# Patient Record
Sex: MALE | Race: WHITE | NOT HISPANIC OR LATINO | Employment: FULL TIME | URBAN - METROPOLITAN AREA
[De-identification: names, ages, dates, MRNs, and addresses within clinical notes are randomized per-mention and may not be internally consistent; named-entity substitution may affect disease eponyms.]

---

## 2022-07-18 ENCOUNTER — OFFICE VISIT (OUTPATIENT)
Dept: FAMILY MEDICINE CLINIC | Facility: CLINIC | Age: 32
End: 2022-07-18

## 2022-07-18 VITALS
DIASTOLIC BLOOD PRESSURE: 80 MMHG | HEART RATE: 92 BPM | WEIGHT: 220.4 LBS | HEIGHT: 66 IN | OXYGEN SATURATION: 98 % | BODY MASS INDEX: 35.42 KG/M2 | SYSTOLIC BLOOD PRESSURE: 128 MMHG

## 2022-07-18 DIAGNOSIS — E66.09 CLASS 2 OBESITY DUE TO EXCESS CALORIES WITHOUT SERIOUS COMORBIDITY WITH BODY MASS INDEX (BMI) OF 35.0 TO 35.9 IN ADULT: ICD-10-CM

## 2022-07-18 DIAGNOSIS — B35.6 TINEA CRURIS: Primary | ICD-10-CM

## 2022-07-18 PROBLEM — U09.9 LONG COVID: Status: ACTIVE | Noted: 2022-07-18

## 2022-07-18 PROCEDURE — 99203 OFFICE O/P NEW LOW 30 MIN: CPT | Performed by: FAMILY MEDICINE

## 2022-07-18 RX ORDER — CLOTRIMAZOLE 1 %
1 CREAM (GRAM) TOPICAL 2 TIMES DAILY
COMMUNITY
End: 2022-07-18

## 2022-07-18 RX ORDER — FLUCONAZOLE 50 MG/1
50 TABLET ORAL DAILY
Qty: 14 TABLET | Refills: 0 | Status: SHIPPED | OUTPATIENT
Start: 2022-07-18 | End: 2022-08-01

## 2022-08-12 DIAGNOSIS — B35.6 TINEA CRURIS: ICD-10-CM

## 2022-08-12 RX ORDER — FLUCONAZOLE 50 MG/1
50 TABLET ORAL DAILY
Qty: 14 TABLET | Refills: 0 | Status: CANCELLED | OUTPATIENT
Start: 2022-08-12 | End: 2022-08-26

## 2022-08-13 NOTE — TELEPHONE ENCOUNTER
Rx Refill Note  Requested Prescriptions     Pending Prescriptions Disp Refills   • Ketoconazole 2 % gel 45 g 2     Sig: Apply 1 application topically Daily.   • fluconazole (DIFLUCAN) 50 MG tablet 14 tablet 0     Sig: Take 1 tablet by mouth Daily for 14 days. May need to be repeated up to 6 weeks      Last office visit with prescribing clinician: 7/18/2022      Next office visit with prescribing clinician: 1/18/2023 }  Bonnie Carlson MA  08/13/22, 09:41 EDT     Last fill: 07/18/2022

## 2023-01-18 ENCOUNTER — OFFICE VISIT (OUTPATIENT)
Dept: FAMILY MEDICINE CLINIC | Facility: CLINIC | Age: 33
End: 2023-01-18
Payer: COMMERCIAL

## 2023-01-18 ENCOUNTER — LAB (OUTPATIENT)
Dept: LAB | Facility: HOSPITAL | Age: 33
End: 2023-01-18
Payer: COMMERCIAL

## 2023-01-18 VITALS
HEART RATE: 105 BPM | OXYGEN SATURATION: 99 % | HEIGHT: 66 IN | DIASTOLIC BLOOD PRESSURE: 68 MMHG | TEMPERATURE: 97.8 F | BODY MASS INDEX: 35.17 KG/M2 | SYSTOLIC BLOOD PRESSURE: 100 MMHG | WEIGHT: 218.8 LBS

## 2023-01-18 DIAGNOSIS — Z13.220 SCREENING FOR HYPERLIPIDEMIA: ICD-10-CM

## 2023-01-18 DIAGNOSIS — Z13.0 SCREENING FOR DEFICIENCY ANEMIA: ICD-10-CM

## 2023-01-18 DIAGNOSIS — R07.89 ATYPICAL CHEST PAIN: ICD-10-CM

## 2023-01-18 DIAGNOSIS — E66.09 CLASS 2 OBESITY DUE TO EXCESS CALORIES WITHOUT SERIOUS COMORBIDITY WITH BODY MASS INDEX (BMI) OF 35.0 TO 35.9 IN ADULT: ICD-10-CM

## 2023-01-18 DIAGNOSIS — Z13.29 SCREENING FOR ENDOCRINE DISORDER: ICD-10-CM

## 2023-01-18 DIAGNOSIS — Z23 IMMUNIZATION DUE: ICD-10-CM

## 2023-01-18 DIAGNOSIS — U09.9 LONG COVID: ICD-10-CM

## 2023-01-18 DIAGNOSIS — Z00.00 WELL ADULT EXAM: Primary | ICD-10-CM

## 2023-01-18 LAB
ALBUMIN SERPL-MCNC: 4.8 G/DL (ref 3.5–5.2)
ALBUMIN/GLOB SERPL: 1.8 G/DL
ALP SERPL-CCNC: 42 U/L (ref 39–117)
ALT SERPL W P-5'-P-CCNC: 49 U/L (ref 1–41)
ANION GAP SERPL CALCULATED.3IONS-SCNC: 6.4 MMOL/L (ref 5–15)
AST SERPL-CCNC: 30 U/L (ref 1–40)
BILIRUB SERPL-MCNC: 0.4 MG/DL (ref 0–1.2)
BUN SERPL-MCNC: 11 MG/DL (ref 6–20)
BUN/CREAT SERPL: 10.8 (ref 7–25)
CALCIUM SPEC-SCNC: 9.8 MG/DL (ref 8.6–10.5)
CHLORIDE SERPL-SCNC: 103 MMOL/L (ref 98–107)
CHOLEST SERPL-MCNC: 166 MG/DL (ref 0–200)
CO2 SERPL-SCNC: 29.6 MMOL/L (ref 22–29)
CREAT SERPL-MCNC: 1.02 MG/DL (ref 0.76–1.27)
CRP SERPL-MCNC: <0.3 MG/DL (ref 0–0.5)
DEPRECATED RDW RBC AUTO: 40.6 FL (ref 37–54)
EGFRCR SERPLBLD CKD-EPI 2021: 99.5 ML/MIN/1.73
ERYTHROCYTE [DISTWIDTH] IN BLOOD BY AUTOMATED COUNT: 12.5 % (ref 12.3–15.4)
ERYTHROCYTE [SEDIMENTATION RATE] IN BLOOD: 14 MM/HR (ref 0–15)
GLOBULIN UR ELPH-MCNC: 2.6 GM/DL
GLUCOSE SERPL-MCNC: 97 MG/DL (ref 65–99)
HBA1C MFR BLD: 5.6 % (ref 4.8–5.6)
HCT VFR BLD AUTO: 44.6 % (ref 37.5–51)
HCV AB SER DONR QL: NORMAL
HDLC SERPL-MCNC: 48 MG/DL (ref 40–60)
HGB BLD-MCNC: 15.4 G/DL (ref 13–17.7)
LDLC SERPL CALC-MCNC: 91 MG/DL (ref 0–100)
LDLC/HDLC SERPL: 1.82 {RATIO}
MCH RBC QN AUTO: 30.9 PG (ref 26.6–33)
MCHC RBC AUTO-ENTMCNC: 34.5 G/DL (ref 31.5–35.7)
MCV RBC AUTO: 89.4 FL (ref 79–97)
PLATELET # BLD AUTO: 245 10*3/MM3 (ref 140–450)
PMV BLD AUTO: 10.2 FL (ref 6–12)
POTASSIUM SERPL-SCNC: 4.3 MMOL/L (ref 3.5–5.2)
PROT SERPL-MCNC: 7.4 G/DL (ref 6–8.5)
RBC # BLD AUTO: 4.99 10*6/MM3 (ref 4.14–5.8)
SODIUM SERPL-SCNC: 139 MMOL/L (ref 136–145)
TRIGL SERPL-MCNC: 153 MG/DL (ref 0–150)
TSH SERPL DL<=0.05 MIU/L-ACNC: 0.66 UIU/ML (ref 0.27–4.2)
VLDLC SERPL-MCNC: 27 MG/DL (ref 5–40)
WBC NRBC COR # BLD: 8.18 10*3/MM3 (ref 3.4–10.8)

## 2023-01-18 PROCEDURE — 80061 LIPID PANEL: CPT | Performed by: FAMILY MEDICINE

## 2023-01-18 PROCEDURE — 99395 PREV VISIT EST AGE 18-39: CPT | Performed by: FAMILY MEDICINE

## 2023-01-18 PROCEDURE — 86803 HEPATITIS C AB TEST: CPT | Performed by: FAMILY MEDICINE

## 2023-01-18 PROCEDURE — 83036 HEMOGLOBIN GLYCOSYLATED A1C: CPT | Performed by: FAMILY MEDICINE

## 2023-01-18 PROCEDURE — 80053 COMPREHEN METABOLIC PANEL: CPT | Performed by: FAMILY MEDICINE

## 2023-01-18 PROCEDURE — 86140 C-REACTIVE PROTEIN: CPT | Performed by: FAMILY MEDICINE

## 2023-01-18 PROCEDURE — 85027 COMPLETE CBC AUTOMATED: CPT | Performed by: FAMILY MEDICINE

## 2023-01-18 PROCEDURE — 85652 RBC SED RATE AUTOMATED: CPT | Performed by: FAMILY MEDICINE

## 2023-01-18 PROCEDURE — 84443 ASSAY THYROID STIM HORMONE: CPT | Performed by: FAMILY MEDICINE

## 2023-01-18 NOTE — LETTER
"VACCINE CONSENT FORM      Patient Name:  Igor Graham    Patient :  1990      I/We have read, or have been explained, the information about the diseases and the vaccines listed below.  There was an opportunity to ask questions and any questions were answered satisfactorily.  I/We believe that I/we understand the benefits and risks of the vaccines(s) cited, and ask the vaccine(s) listed below be given to me/us or the person named above (for which i have authorized to make the request).      Vaccine(s) give:    Orders Placed This Encounter   Procedures   • Tdap Vaccine Greater Than or Equal To 8yo IM         Medicare patients:    The only vaccine covered under your medical benefit is flu/pneumonia and hepatitis B.  All other may be covered under your \"Part D\" prescription plan and requires you to go to a pharmacy with a Physician orders for administration.  If you still prefer to have it administered at our office, you will receive a bill for the vaccine and administration cost.               Patient Initials                     Patient or Parent/Guardian Signature                    Date        A copy of the appropriate Centers for Disease Control and Prevention Vaccine Information Statements has been provided.   "

## 2023-01-18 NOTE — PROGRESS NOTES
Annual Well Adult Visit     Patient Name: Igor Graham  : 1990   MRN: [id number]   Care Team: Patient Care Team:  Francois Porter DO as PCP - General (Family Medicine)    Chief Complaint:    Chief Complaint   Patient presents with   • Annual Exam       History of Present Illness: Igor Graham is a 32 y.o. male who presents today for annual physical exam and preventative care.    HPI    The patient presents today for an annual well adult exam. His last visit with me was in 2022 to establish care and for an acute concern of tinea cruris. He was prescribed Diflucan for 2 weeks along with ketoconazole gel.    The patient reports that he saw a dermatologist who told him that his symptoms were a result of some type of inflammation as opposed to jock itch and prescribed him an anti-inflammatory cream. He add that his symptoms have completely resolved at this time.     The patient denies any major changes in his health since our last visit in 2022. He states that he does occasionally notice some body aches and chest pain that he attributes long COVID-19 symptoms. He denies any urinary issues related to his long COVID-19 symptoms. He denies painful or nocturnal urination. He reports that he has been dealing with long COVID-19 symptoms since  and has just been dealing with it.     The patient reports that the physician he visited in  and  for his long COVID-19 symptoms told him that he may have myocarditis. He adds that he told him at that point in time that the best thing for him to do would be to work out. He notes that he had told that physician that he was having consistent chest pain and his heart rate was also consistently elevated which brought that doctor to the possibility of myocarditis. The patient is asking if he should have been referred to a cardiologist for this issue. The patient denies working out due to the fact that he did not want to attend a gym however, he has recently  purchased some home gym equipment to begin exercising at home. He adds that he and his wife became ill the week after [place] and he has not quite gotten back into the swing of things yet.     He reports that he has plans to begin seeing a dentist in 2023. He denies having any issues with his hearing. He adds that he had frequent ear infections as a child which led to having tubes placed in his ears. He notes that he has had one ear infection as an adult when he was approximately 19 years old. He denies having any vision problems at this time and has had eye surgery in the past. He adds that he has occasional double vision. He denies any testicular swelling, lumps, or concerns. He denies any sexual dysfunction or erectile issues. He denies any alcohol, drug, or tobacco use. He denies any numbness or tingling in his hands or feet.     He reports that his heart rate has not been severely elevated since he attended physical therapy. He states that if he is relaxed and sitting down it is usually at 100 bpm or less. He adds that if he gets up and starts moving around and he checks his heart rate, he notes that it may get up to 110 bpm. He states that before he became ill his heart rate would average approximately 60 to 70 bpm.     The patient is unsure as to when he most recent tetanus vaccination occurred. He notes that he visited the emergency room in [place] in 2017 when he cut the tip of his finger and had to have them glue it closed and he does not remember if they gave him any injections at that time. He is agreeable to receiving a tetanus booster at this visit. He notes that he has had a tear in his meniscus in the past.     This patient is accompanied by their self who contributes to the history of their care.    The following portions of the patient's history were reviewed and updated as appropriate: allergies, current medications, past family history, past medical history, past social history, past  surgical history and problem list.       Allied Screenings    N/A         Date      Eye Exam       []              []   Up to date    Location:   []   Recommended       Counseled every 2 years in those without known issues, yearly if wearing glasses or contacts      Dental Exam       []           []   Up to date   Location:   []   Recommended       Counseled, recommended cleanings every 6 months, daily brushing and flossing        Skin Cancer Screening        []            []   Up to date   Location:   []   Recommended Counseled on regular sunscreen wear, self-skin checks      Obesity Counseling        []        []   Complete   []   Nutritionist referral   []   Declined Counseled on moderate portions, low meat diet focusing on whole foods and plant-based protein           Diabetes  [] Yes  [] No    N/A         Date      Eye Exam       []             []   Complete         Date:  Where:         Foot Exam       []           []   Complete              Obesity Counseling       []        []   Complete          Additional Testing         Date      Colorectal Screening        []   N/A   []   Complete         Date:     Where:         Pap        []   N/A   []   Complete    Date:     Where:         Mammogram           []   N/A   []   Complete Date:     Where:      PSA  (Over age 50)     []   N/A   []   Complete    Date:     Where:      US Aorta  (For male with >20 cigarette history, age 65)     []   N/A   []   Complete    Date:     Where:      CT for Smoker  (Age 55-75, 30 pk yr)     []   N/A   []   Complete    Date:     Where:      Bone Density/DEXA        []   N/A   []   Complete    Date:     Follow-up:      Hep. C     []   N/A   []   Complete         Subjective      Review of Systems:   Review of Systems - See HPI    Past Medical History:   Past Medical History:   Diagnosis Date   • COVID-19 03/2020   • History of medical problems surgery on meniscus tear   • Migraine headache        Past Surgical History:   Past Surgical  History:   Procedure Laterality Date   • EYE SURGERY Right     lazy eye   • KNEE SURGERY Right        Family History:   Family History   Problem Relation Age of Onset   • Diabetes Mother    • Alcohol abuse Mother         for the past 4 years   • Heart disease Father         Heart valve defect (bipedal heart valve)   • Alcohol abuse Maternal Grandfather         most of life   • Learning disabilities Brother         ADHD       Social History:   Social History     Socioeconomic History   • Marital status:    Tobacco Use   • Smoking status: Former     Packs/day: 0.25     Years: 4.00     Pack years: 1.00     Types: Cigarettes, Cigars     Start date: 2012     Quit date: 2018     Years since quittin.0   • Smokeless tobacco: Never   • Tobacco comments:     I smoked off and on cigars and cigarettes in my 20's   Vaping Use   • Vaping Use: Never used   Substance and Sexual Activity   • Alcohol use: Never   • Drug use: Never   • Sexual activity: Yes     Partners: Female     Birth control/protection: Nexplanon     Comment: Spouse has Nexplanon       Tobacco History:   Social History     Tobacco Use   Smoking Status Former   • Packs/day: 0.25   • Years: 4.00   • Pack years: 1.00   • Types: Cigarettes, Cigars   • Start date: 2012   • Quit date: 2018   • Years since quittin.0   Smokeless Tobacco Never   Tobacco Comments    I smoked off and on cigars and cigarettes in my 20's       Medications:   No current outpatient medications on file.    Immunizations:     Immunizations    N/A    Prescribed/Refused    Date      Td/Tdap  (Booster Q 10 yrs)    []             Prescribed    []     Refused        []             Flu  (Yearly)    []          Prescribed    []     Refused        []              Pneumovax  (1 yr after Prevnar)    []          Prescribed    []     Refused        []              Prevnar 20    []          Prescribed    []     Refused        []              Hep B       []          Prescribed     "[]     Refused        []             COVID-19   []      Prescribed    []     Refused        []          Shingrix  (Age 50 and older)    []          Prescribed    []     Refused        []              Immunization History   Administered Date(s) Administered   • COVID-19 (MODERNA) BIVALENT BOOSTER 12+YRS 10/04/2022   • COVID-19 (PFIZER) PURPLE CAP 03/10/2021, 04/01/2021, 11/03/2021   • COVID-19 (UNSPECIFIED) 03/03/2021, 04/01/2021   • FluLaval/Fluzone >6mos 10/04/2022   • Fluzone Quad >6mos (Multi-dose) 10/01/2019   • Influenza, Unspecified 11/03/2021        Allergies:   No Known Allergies    Objective   Objective     Physical Exam:  Vital Signs:   Vitals:    01/18/23 0855   BP: 100/68   BP Location: Left arm   Patient Position: Sitting   Cuff Size: Adult   Pulse: 105   Temp: 97.8 °F (36.6 °C)   TempSrc: Infrared   SpO2: 99%   Weight: 99.2 kg (218 lb 12.8 oz)   Height: 167.6 cm (66\")     Body mass index is 35.32 kg/m².     Physical Exam  Nursing note reviewed  General: Patient is well-nourished, well-developed, and in no acute distress.  HEENT: Normocephalic with no contusions noted, no ptosis or palsy. Pupils equally round and reactive to light, extraocular movements intact. Patient holds steady gaze, can follow to midline. Hearing grossly normal without deficit, exterior auricles normal, tympanic membranes normal without erythema or bulging.  Lymphatic: Posterior auricular, cervical, submandibular, supraclavicular, axillary lymphatic sites palpated without abnormality  Cardiovascular: Normal study rate without ectopy. PMI palpated, normal. Normal S1, S2. No murmurs rubs or gallops.  Respiratory: No tenderness to palpation on the chest wall, lungs clear to auscultation bilaterally, no wheezes, rales, or rhonchi. No pleural friction rubs.  Gastrointestinal: Bowel sounds present, normoactive globally. No bruit noted. Nontender, nondistended. Normal percussive exam, no hepatomegaly, no splenomegaly. No hernias, scars, " gross lesions.  Extremities: No cyanosis or edema, 2+ pulses bilaterally, reflexes normal. Capillary refill time normal.  MSK: Normal gait and station. 5/5 strength globally.  Neuro: Cranial nerves II-XII grossly intact. Patient alert and oriented x3.  PHQ-9 Depression Screening  Little interest or pleasure in doing things?     Feeling down, depressed, or hopeless?     Trouble falling or staying asleep, or sleeping too much?     Feeling tired or having little energy?     Poor appetite or overeating?     Feeling bad about yourself - or that you are a failure or have let yourself or your family down?     Trouble concentrating on things, such as reading the newspaper or watching television?     Moving or speaking so slowly that other people could have noticed? Or the opposite - being so fidgety or restless that you have been moving around a lot more than usual?     Thoughts that you would be better off dead, or of hurting yourself in some way?     PHQ-9 Total Score     If you checked off any problems, how difficult have these problems made it for you to do your work, take care of things at home, or get along with other people?       Procedures/Radiology     Procedures  No radiology results for the last 7 days     Assessment & Plan   Assessment / Plan      Assessment/Plan:   Problems Addressed This Visit  Diagnoses and all orders for this visit:    1. Well adult exam (Primary)  -     Hepatitis C Antibody; Future  -     Hepatitis C Antibody    2. Class 2 obesity due to excess calories without serious comorbidity with body mass index (BMI) of 35.0 to 35.9 in adult  Assessment & Plan:  Patient's (Body mass index is 35.32 kg/m².) indicates that they are obese (BMI >30) with health conditions that include none . Weight is unchanged. BMI is is above average; BMI management plan is completed. We discussed low calorie, low carb based diet program, portion control, increasing exercise and joining a fitness center or start home  based exercise program.       3. Long COVID  Assessment & Plan:  Having persistent chest discomfort, exercise-induced tachycardia. Concerned for myocarditis. Counseled on management of myocarditis overall and options including expectant management via graduated exercise program, imaging. After discussion would like to proceed with echo to determine status so he can progress his exercise with a better idea.  -CRP, ESR, echo    Orders:  -     C-reactive Protein; Future  -     Sedimentation Rate; Future  -     Adult Transthoracic Echo Complete W/ Cont if Necessary Per Protocol; Future  -     C-reactive Protein  -     Sedimentation Rate    4. Screening for deficiency anemia  -     CBC (No Diff); Future  -     CBC (No Diff)    5. Screening for endocrine disorder  -     Hemoglobin A1c; Future  -     Comprehensive Metabolic Panel; Future  -     Urinalysis With Microscopic If Indicated (No Culture) - Urine, Clean Catch; Future  -     TSH; Future  -     Hemoglobin A1c  -     Comprehensive Metabolic Panel  -     TSH    6. Screening for hyperlipidemia  -     Lipid Panel; Future  -     Lipid Panel    7. Immunization due  -     Tdap Vaccine Greater Than or Equal To 8yo IM    8. Atypical chest pain  -     Adult Transthoracic Echo Complete W/ Cont if Necessary Per Protocol; Future    Problem List Items Addressed This Visit        Endocrine and Metabolic    Class 2 obesity due to excess calories without serious comorbidity with body mass index (BMI) of 35.0 to 35.9 in adult    Current Assessment & Plan     Patient's (Body mass index is 35.32 kg/m².) indicates that they are obese (BMI >30) with health conditions that include none . Weight is unchanged. BMI is is above average; BMI management plan is completed. We discussed low calorie, low carb based diet program, portion control, increasing exercise and joining a fitness center or start home based exercise program.             Other    Long COVID    Overview     COVID early 2020.  Mild persistent symptoms including body aches, headaches, tachycardia (improved after PT).         Current Assessment & Plan     Having persistent chest discomfort, exercise-induced tachycardia. Concerned for myocarditis. Counseled on management of myocarditis overall and options including expectant management via graduated exercise program, imaging. After discussion would like to proceed with echo to determine status so he can progress his exercise with a better idea.  -CRP, ESR, echo         Relevant Orders    C-reactive Protein (Completed)    Sedimentation Rate (Completed)    Adult Transthoracic Echo Complete W/ Cont if Necessary Per Protocol   Other Visit Diagnoses     Well adult exam    -  Primary    Relevant Orders    Hepatitis C Antibody (Completed)    Screening for deficiency anemia        Relevant Orders    CBC (No Diff) (Completed)    Screening for endocrine disorder        Relevant Orders    Hemoglobin A1c (Completed)    Comprehensive Metabolic Panel (Completed)    Urinalysis With Microscopic If Indicated (No Culture) - Urine, Clean Catch    TSH (Completed)    Screening for hyperlipidemia        Relevant Orders    Lipid Panel (Completed)    Immunization due        Relevant Orders    Tdap Vaccine Greater Than or Equal To 6yo IM    Atypical chest pain        Relevant Orders    Adult Transthoracic Echo Complete W/ Cont if Necessary Per Protocol              See patient diagnoses and orders along with patient instructions for assessment, plan, and changes to care for patient.    The preventative exam has been reviewed in detail.  The patient has been fully counseled on preventative guidelines for vaccines, cancer screenings, and other health maintenance needs. The patient was counseled on maintaining a lifestyle to promote good health and to minimize chronic diseases.  The patient has been assisted with scheduling healthcare procedures for the coming year and given a written document outlining these  recommendations. Age-appropriate screening measures have been ordered for the patient today as indicated above.    There are no Patient Instructions on file for this visit.    Follow Up:   Return in about 1 year (around 1/18/2024) for Annual.      DO GORDON Espinosa RD  [facility]  [address]  [facility] [address]  Fax [phone]  Phone [phone]      Transcribed from ambient dictation for Francois Porter DO by Nkechi Rojo.  01/18/23   10:28 EST    Patient or patient representative verbalized consent to the visit recording.  I have personally performed the services described in this document as transcribed by the above individual, and it is both accurate and complete.

## 2023-01-18 NOTE — ASSESSMENT & PLAN NOTE
Patient's (Body mass index is 35.32 kg/m².) indicates that they are obese (BMI >30) with health conditions that include none . Weight is unchanged. BMI is is above average; BMI management plan is completed. We discussed low calorie, low carb based diet program, portion control, increasing exercise and joining a fitness center or start home based exercise program.

## 2023-01-18 NOTE — ASSESSMENT & PLAN NOTE
Having persistent chest discomfort, exercise-induced tachycardia. Concerned for myocarditis. Counseled on management of myocarditis overall and options including expectant management via graduated exercise program, imaging. After discussion would like to proceed with echo to determine status so he can progress his exercise with a better idea.  -CRP, ESR, echo